# Patient Record
Sex: MALE | Race: WHITE | Employment: FULL TIME | URBAN - METROPOLITAN AREA
[De-identification: names, ages, dates, MRNs, and addresses within clinical notes are randomized per-mention and may not be internally consistent; named-entity substitution may affect disease eponyms.]

---

## 2022-07-04 NOTE — PROGRESS NOTES
[facility] CARDIOLOGY History & Physical                 Reason for Visit: Palpitations    Subjective:     Patient is a 40 y.o. male who presents as a referral for palpitations. It is documented by the ED provider on June 20 that the patient has a \"past history of A. fib 10 years prior and several TIAs\" (the patient denies a diagnosis of atrial fibrillation but reports a history of TIA). The patient presented on June 20 to the ED at [address] with palpitations. Troponin was noted to be undetectably low x2. The patient's BNP was undetectably low as well. His D-dimer was elevated; however, a CTA was negative for PE. The patient reports having a tachycardia sensation while working from home that prompted him to present to the ED. He says that he had a similar episode about 2 years ago while driving. He also reports feeling \"foggy headed all of a sudden\" occasionally with the last episode occurring within the last 14 days. He reports that he previously weighed over 400 pounds and has lost weight through diet and exercise. The patient denies dyspnea on exertion and angina when he exercises. He denies angina at rest.  He also denies ever wearing an ambulatory ECG monitor. The patient reports that the ED provider surmised that he had atrial fibrillation based on his history of palpitations and \"TIAs\". No past medical history on file. No past surgical history on file. No family history on file. Social History     Tobacco Use    Smoking status: Not on file    Smokeless tobacco: Not on file   Substance Use Topics    Alcohol use: Not on file      Not on File      ROS:  No obvious pertinent positives on review of systems except for what was outlined above.        Objective:       /80   Pulse 64   Ht 5' 7\" (1.702 m)   Wt 300 lb (136.1 kg)   BMI 46.99 kg/m²     BP Readings from Last 3 Encounters:   07/05/22 124/80       Wt Readings from Last 3 Encounters:   07/05/22 300 lb (136.1 kg) General/Constitutional:   Alert and oriented x 3, no acute distress  HEENT:   normocephalic, atraumatic, moist mucous membranes  Neck:   No JVD or carotid bruits bilaterally  Cardiovascular:   regular rate and rhythm, no rub/gallop appreciated  Pulmonary:   clear to auscultation bilaterally, no respiratory distress  Abdomen:   soft, non-tender, non-distended  Ext:   No sig LE edema bilaterally  Skin:  warm and dry, no obvious rashes seen  Neuro:   no obvious sensory or motor deficits  Psychiatric:   normal mood and affect    ECG:   Sinus rhythm  Normal ECG  Heart rate 64 bpm    Data Review:   No results found for: CHOL  No results found for: TRIG  No results found for: HDL  No results found for: LDLCHOLESTEROL, LDLCALC  No results found for: LABVLDL, VLDL  No results found for: CHOLHDLRATIO     No results found for: NA, K, CL, CO2, BUN, CREATININE, GLUCOSE, CALCIUM      No results found for: ALT, AST     Assessment/Plan:   1. Hypertension, unspecified type  - Well-controlled  - Currently on olmesartan- amlodipine-hydrochlorothiazide    2. Near syncope  - Obtain a ZIO for 14 days    3.  Palpitations  - Obtain a ZIO for 14 days    F/U: As needed    Augustus Zhao MD

## 2022-07-05 ENCOUNTER — OFFICE VISIT (OUTPATIENT)
Dept: CARDIOLOGY CLINIC | Age: 47
End: 2022-07-05
Payer: COMMERCIAL

## 2022-07-05 VITALS
HEART RATE: 64 BPM | HEIGHT: 67 IN | WEIGHT: 300 LBS | DIASTOLIC BLOOD PRESSURE: 80 MMHG | SYSTOLIC BLOOD PRESSURE: 124 MMHG | BODY MASS INDEX: 47.09 KG/M2

## 2022-07-05 DIAGNOSIS — I10 HYPERTENSION, UNSPECIFIED TYPE: Primary | ICD-10-CM

## 2022-07-05 DIAGNOSIS — R55 NEAR SYNCOPE: ICD-10-CM

## 2022-07-05 DIAGNOSIS — R00.2 PALPITATIONS: ICD-10-CM

## 2022-07-05 PROCEDURE — 93000 ELECTROCARDIOGRAM COMPLETE: CPT | Performed by: INTERNAL MEDICINE

## 2022-07-05 PROCEDURE — 99203 OFFICE O/P NEW LOW 30 MIN: CPT | Performed by: INTERNAL MEDICINE

## 2022-07-05 RX ORDER — TESTOSTERONE CYPIONATE 200 MG/ML
INJECTION INTRAMUSCULAR
COMMUNITY
Start: 2022-06-17

## 2022-07-05 RX ORDER — OLMESARTAN MEDOXOMIL / AMLODIPINE BESYLATE / HYDROCHLOROTHIAZIDE 40; 10; 12.5 MG/1; MG/1; MG/1
TABLET, FILM COATED ORAL
COMMUNITY
Start: 2022-06-13

## 2022-07-05 RX ORDER — ACETAMINOPHEN 160 MG
2000 TABLET,DISINTEGRATING ORAL DAILY
COMMUNITY

## 2022-07-05 RX ORDER — ASPIRIN 81 MG/1
81 TABLET, CHEWABLE ORAL DAILY
COMMUNITY

## 2022-07-28 ENCOUNTER — TELEPHONE (OUTPATIENT)
Dept: CARDIOLOGY CLINIC | Age: 47
End: 2022-07-28

## 2022-07-28 NOTE — TELEPHONE ENCOUNTER
----- Message from Sudeep Neal MD sent at 7/28/2022 10:37 AM EDT -----  Please let the patient know that the patient was predominantly in sinus rhythm. The patient had rare ectopy and asymptomatic, nonsustained SVT. No new changes to medical therapy at this time.